# Patient Record
Sex: FEMALE | Race: WHITE | Employment: FULL TIME
[De-identification: names, ages, dates, MRNs, and addresses within clinical notes are randomized per-mention and may not be internally consistent; named-entity substitution may affect disease eponyms.]

---

## 2017-08-19 ENCOUNTER — HEALTH MAINTENANCE LETTER (OUTPATIENT)
Age: 37
End: 2017-08-19

## 2021-07-16 ENCOUNTER — RECORDS - HEALTHEAST (OUTPATIENT)
Dept: ADMINISTRATIVE | Facility: CLINIC | Age: 41
End: 2021-07-16

## 2022-06-15 ENCOUNTER — TRANSFERRED RECORDS (OUTPATIENT)
Dept: HEALTH INFORMATION MANAGEMENT | Facility: CLINIC | Age: 42
End: 2022-06-15

## 2022-07-19 ENCOUNTER — TRANSFERRED RECORDS (OUTPATIENT)
Dept: HEALTH INFORMATION MANAGEMENT | Facility: CLINIC | Age: 42
End: 2022-07-19

## 2022-07-19 ENCOUNTER — MEDICAL CORRESPONDENCE (OUTPATIENT)
Dept: HEALTH INFORMATION MANAGEMENT | Facility: CLINIC | Age: 42
End: 2022-07-19

## 2022-08-01 ENCOUNTER — HOSPITAL ENCOUNTER (OUTPATIENT)
Dept: MRI IMAGING | Facility: CLINIC | Age: 42
Discharge: HOME | End: 2022-08-01
Attending: SPECIALIST | Admitting: SPECIALIST
Payer: COMMERCIAL

## 2022-08-01 DIAGNOSIS — E23.7 PITUITARY DISEASE (H): ICD-10-CM

## 2022-08-01 PROCEDURE — 70543 MRI ORBT/FAC/NCK W/O &W/DYE: CPT

## 2022-08-01 PROCEDURE — A9585 GADOBUTROL INJECTION: HCPCS

## 2022-08-01 PROCEDURE — 255N000002 HC RX 255 OP 636

## 2022-08-01 RX ORDER — GADOBUTROL 604.72 MG/ML
8 INJECTION INTRAVENOUS ONCE
Status: COMPLETED | OUTPATIENT
Start: 2022-08-01 | End: 2022-08-01

## 2022-08-01 RX ADMIN — GADOBUTROL 8 ML: 604.72 INJECTION INTRAVENOUS at 07:49

## 2022-08-04 ENCOUNTER — OFFICE VISIT (OUTPATIENT)
Dept: SURGERY | Facility: CLINIC | Age: 42
End: 2022-08-04
Payer: COMMERCIAL

## 2022-08-04 ENCOUNTER — TELEPHONE (OUTPATIENT)
Dept: SURGERY | Facility: CLINIC | Age: 42
End: 2022-08-04

## 2022-08-04 VITALS
BODY MASS INDEX: 32.14 KG/M2 | WEIGHT: 200 LBS | HEART RATE: 79 BPM | HEIGHT: 66 IN | SYSTOLIC BLOOD PRESSURE: 120 MMHG | DIASTOLIC BLOOD PRESSURE: 80 MMHG

## 2022-08-04 DIAGNOSIS — E04.1 THYROID CYST: ICD-10-CM

## 2022-08-04 PROCEDURE — 99205 OFFICE O/P NEW HI 60 MIN: CPT | Performed by: SURGERY

## 2022-08-04 RX ORDER — EZETIMIBE 10 MG/1
10 TABLET ORAL DAILY
COMMUNITY

## 2022-08-04 NOTE — LETTER
August 8, 2022          Nika Hale MD  ENDOCRINOLOGY [facility]  [address] [facility] [address]  [place] [address]      RE:   Sergio Driver 1980      Dear Colleague,    Thank you for referring your patient, Sergio Driver, to [facility] at [facility] [place]. Please see a copy of my visit note below.    Sergio Driver is a 42 year old female who presented with the recent discovery of an enlarged right neck mass.  She was evaluated for this and this was thought to be in continuity with the thyroid gland.  Ultrasound was ordered and this revealed a cystic mass in the right lower lobe of the thyroid.  This measured approximately 5.2 cm but did not have imaging characteristics suspicious for thyroid malignancy.  No biopsy or drainage was performed.  Patient does complain of fullness in the neck as well as some difficulty sleeping.  Frequent cough, difficulty clearing her throat.  No family history of thyroid cancer.  Is euthyroid, no history of surgery to the head or neck.  She is here to discuss possible surgical intervention.     PMH:  Sergio Driver  has a past medical history of Asherman's syndrome, Benign neoplasm of pituitary gland and craniopharyngeal duct (pouch) (H) (12/31/2007), Depression, High cholesterol, Hypercholesterolemia, Hyperlipidemia, Migraine, Uterine fibroid, Uterine rupture, and UTI (urinary tract infection).  PSH:  Sergio Driver  has a past surgical history that includes GYN surgery; Abdomen surgery; Operative hysteroscopy with morcellator (11/17/2011); Abdomen surgery; Operative hysteroscopy with morcellator (8/9/2013); d & c; ------------other-------------; and Dilation and curettage, operative hysteroscopy with morcellator, combined (N/A, 12/19/2014).     Home medications and allergies reviewed.     Social History:  Sergio Driver  reports that she has never smoked. She has never used smokeless tobacco. She reports current alcohol use. She reports that she does not  "use drugs.  Family History:  Sergio Driver family history includes Cancer in her maternal grandmother; Diabetes in her maternal grandmother; Thyroid Disease in her mother.     ROS:  The 10 point Review of Systems is negative other than noted in the HPI.  Patient does complain of increased fatigue.  No recent weight loss or weight gain.     Physical Exam:  Blood pressure 120/80, pulse 79, height 1.676 m (5' 6\"), weight 90.7 kg (200 lb), unknown if currently breastfeeding.  200 lbs 0 oz  Healthy-appearing young female in no distress.  Patient has a pleasant affect and communicates well.   Pupils equal round and reactive to light.   No cervical lymphadenopathy.  Somewhat subtle but easily palpable right thyroid mass.  Fairly smooth, not terribly discrete from the surrounding thyroid lobe.  Moves appropriately with swallowing.  Skin creases sufficient for thyroid surgery.  Excellent neck range of motion.  Lung fields clear, breathing comfortably.   Heart normal sinus rhythm.  No murmurs rubs or gallops.  Abdomen soft, nontender, nondistended.  Skin warm, dry.  No obvious rashes or lesions.     All new lab and imaging data was reviewed.  This includes outside clinic notes, ultrasound reports, and imaging reports.      Assessment/plan: Pleasant healthy 42-year-old female with a large symptomatic right-sided thyroid cyst.  I explained that imaging characteristics are not particularly suspicious for thyroid neoplasm.  Despite this, given the size and symptomatology, surgery could be considered.  I offered another option which would consist of drainage of the cyst and possible surgery if the cyst fluid reaccumulated.  Patient was very apprehensive about any procedures to her neck while she was awake, going so far as to say that she would need to be under a general anesthetic for thyroid FNA.  Given this constellation of considerations, we elected to proceed with scheduling right thyroid lobectomy.  The lobe would be sent " for frozen section and the left lobe would only be removed if the mass was suspicious for cancer.  I explained the risks and benefits of thyroid surgery which include bleeding, infection, injury to the recurrent laryngeal nerve, and hypocalcemia.  Patient was interested in getting this scheduled which we will do in the near future.  Surgical co-morbities include amenorrhea, hyperlipidemia, Asherman syndrome.        Again, thank you for allowing me to participate in the care of your patient.      Sincerely,      Juan Rogers MD

## 2022-08-04 NOTE — TELEPHONE ENCOUNTER
Type of surgery: right thyroid lobectomy, possible total  Location of surgery: [facility]  Date and time of surgery: 9/7/22 8:55am  Surgeon: Dr Rogers  Pre-Op Appt Date: pt to schedule  Post-Op Appt Date: pt to schedule   Packet sent out: Yes  Pre-cert/Authorization completed:  Not Applicable  Date: 8/4/22

## 2022-08-05 NOTE — PROGRESS NOTES
Surgery Consultation, [facility], JOSE Rogers MD, MD    Sergio Driver MRN# [id number]   YOB: 1980 Age: 42 year old     PCP:  Zenon Driver [phone]    Chief Complaint: Right thyroid mass    Pt was seen in consultation from Zenon Driver.    History of Present Illness:  Sergio Driver is a 42 year old female who presented with the recent discovery of an enlarged right neck mass.  She was evaluated for this and this was thought to be in continuity with the thyroid gland.  Ultrasound was ordered and this revealed a cystic mass in the right lower lobe of the thyroid.  This measured approximately 5.2 cm but did not have imaging characteristics suspicious for thyroid malignancy.  No biopsy or drainage was performed.  Patient does complain of fullness in the neck as well as some difficulty sleeping.  Frequent cough, difficulty clearing her throat.  No family history of thyroid cancer.  Is euthyroid, no history of surgery to the head or neck.  She is here to discuss possible surgical intervention.    PMH:  Sergio Driver  has a past medical history of Asherman's syndrome, Benign neoplasm of pituitary gland and craniopharyngeal duct (pouch) (H) (12/31/2007), Depression, High cholesterol, Hypercholesterolemia, Hyperlipidemia, Migraine, Uterine fibroid, Uterine rupture, and UTI (urinary tract infection).  PSH:  Sergio Driver  has a past surgical history that includes GYN surgery; Abdomen surgery; Operative hysteroscopy with morcellator (11/17/2011); Abdomen surgery; Operative hysteroscopy with morcellator (8/9/2013); d & c; ------------other-------------; and Dilation and curettage, operative hysteroscopy with morcellator, combined (N/A, 12/19/2014).    Home medications and allergies reviewed.    Social History:  Sergio Driver  reports that she has never smoked. She has never used smokeless tobacco. She reports current alcohol use. She reports that she does not use  "drugs.  Family History:  Sergio Driver family history includes Cancer in her maternal grandmother; Diabetes in her maternal grandmother; Thyroid Disease in her mother.    ROS:  The 10 point Review of Systems is negative other than noted in the HPI.  Patient does complain of increased fatigue.  No recent weight loss or weight gain.    Physical Exam:  Blood pressure 120/80, pulse 79, height 1.676 m (5' 6\"), weight 90.7 kg (200 lb), unknown if currently breastfeeding.  200 lbs 0 oz  Healthy-appearing young female in no distress.  Patient has a pleasant affect and communicates well.   Pupils equal round and reactive to light.   No cervical lymphadenopathy.  Somewhat subtle but easily palpable right thyroid mass.  Fairly smooth, not terribly discrete from the surrounding thyroid lobe.  Moves appropriately with swallowing.  Skin creases sufficient for thyroid surgery.  Excellent neck range of motion.  Lung fields clear, breathing comfortably.   Heart normal sinus rhythm.  No murmurs rubs or gallops.  Abdomen soft, nontender, nondistended.  Skin warm, dry.  No obvious rashes or lesions.    All new lab and imaging data was reviewed.  This includes outside clinic notes, ultrasound reports, and imaging reports.     Assessment/plan: Pleasant healthy 42-year-old female with a large symptomatic right-sided thyroid cyst.  I explained that imaging characteristics are not particularly suspicious for thyroid neoplasm.  Despite this, given the size and symptomatology, surgery could be considered.  I offered another option which would consist of drainage of the cyst and possible surgery if the cyst fluid reaccumulated.  Patient was very apprehensive about any procedures to her neck while she was awake, going so far as to say that she would need to be under a general anesthetic for thyroid FNA.  Given this constellation of considerations, we elected to proceed with scheduling right thyroid lobectomy.  The lobe would be sent for " frozen section and the left lobe would only be removed if the mass was suspicious for cancer.  I explained the risks and benefits of thyroid surgery which include bleeding, infection, injury to the recurrent laryngeal nerve, and hypocalcemia.  Patient was interested in getting this scheduled which we will do in the near future.  Surgical co-morbities include amenorrhea, hyperlipidemia, Asherman syndrome.    Aj Rogers M.D.  [facility]  [phone]    Please route or send letter to:  Primary Care Provider (PCP) and Referring Provider

## 2022-08-09 ENCOUNTER — TELEPHONE (OUTPATIENT)
Dept: SURGERY | Facility: CLINIC | Age: 42
End: 2022-08-09

## 2022-08-09 NOTE — TELEPHONE ENCOUNTER
Patient called and needs a letter for her work stating she will need time off for her surgery. She stated she will take 1 week off, returning to work on 9/15/22. Letter written and e-mailed to [email or web address]    Ami Driver MA

## 2022-09-04 ENCOUNTER — LAB (OUTPATIENT)
Dept: FAMILY MEDICINE | Facility: CLINIC | Age: 42
End: 2022-09-04
Payer: COMMERCIAL

## 2022-09-04 DIAGNOSIS — Z20.822 ENCOUNTER FOR LABORATORY TESTING FOR COVID-19 VIRUS: ICD-10-CM

## 2022-09-04 PROCEDURE — U0005 INFEC AGEN DETEC AMPLI PROBE: HCPCS

## 2022-09-04 PROCEDURE — 99207 PR NO CHARGE LOS: CPT

## 2022-09-04 PROCEDURE — U0003 INFECTIOUS AGENT DETECTION BY NUCLEIC ACID (DNA OR RNA); SEVERE ACUTE RESPIRATORY SYNDROME CORONAVIRUS 2 (SARS-COV-2) (CORONAVIRUS DISEASE [COVID-19]), AMPLIFIED PROBE TECHNIQUE, MAKING USE OF HIGH THROUGHPUT TECHNOLOGIES AS DESCRIBED BY CMS-2020-01-R: HCPCS

## 2022-09-05 LAB — SARS-COV-2 RNA RESP QL NAA+PROBE: NEGATIVE

## 2022-09-06 ENCOUNTER — ANESTHESIA EVENT (OUTPATIENT)
Dept: SURGERY | Facility: CLINIC | Age: 42
End: 2022-09-06
Payer: COMMERCIAL

## 2022-09-06 NOTE — ANESTHESIA PREPROCEDURE EVALUATION
Anesthesia Pre-Procedure Evaluation    Patient: Sergio Driver   MRN: [id number] : 1980        Procedure : Procedure(s):  Right thyroid lobectomy, possible total          Past Medical History:   Diagnosis Date     Anxiety      Asherman's syndrome      Benign neoplasm of pituitary gland and craniopharyngeal duct (pouch) (H) 2007     Cyst, thyroid      Depression      Dysphagia, unspecified      Gastroesophageal reflux disease      High cholesterol      Hypercholesterolemia      Hyperlipidemia      Migraine      Pain, abdominal      Uterine fibroid      Uterine rupture     after d&c     UTI (urinary tract infection)      Vitamin D deficiency       Past Surgical History:   Procedure Laterality Date     ------------OTHER-------------      lap/uterine surgery to repair rupture     ABDOMEN SURGERY      laparoscopy repair after d and c     D & C       DILATION AND CURETTAGE, OPERATIVE HYSTEROSCOPY WITH MORCELLATOR, COMBINED N/A 2014    Procedure: COMBINED DILATION AND CURETTAGE, OPERATIVE HYSTEROSCOPY WITH MORCELLATOR;  Surgeon: Priti Pastrana MD;  Location: [facility]     GENITOURINARY SURGERY      age 8, rectal surgery     GYN SURGERY      D&C, D&C FOR RETAINED PLACENTA, LAPAROSCOPY FOR PERFORATED UTERUS     OPERATIVE HYSTEROSCOPY WITH MORCELLATOR  2011    Procedure:OPERATIVE HYSTEROSCOPY WITH MORCELLATOR; OPERATIVE HYSTEROSCOPY WITH MORCELLATOR WITH LYSIS OF ADHESIONS (MYOSURE DEVISE AND VERSAPOINTE); Surgeon:PRITI PASTRANA; Location:[facility]     OPERATIVE HYSTEROSCOPY WITH MORCELLATOR  2013    Procedure: OPERATIVE HYSTEROSCOPY WITH MORCELLATOR;  OPERATIVE HYSTEROSCOPY, REMOVAL INTRAUTERINE MASS WITH LEWIS [facility]PH[facility]Fair MORCELLATOR ;  Surgeon: Priti Pastrana MD;  Location: [facility]     RECTAL SURGERY        Allergies   Allergen Reactions     Bromocriptine Mesylate Other (See Comments)     vertigo     Sulfa Drugs Hives      Social History     Tobacco Use     Smoking status: Never Smoker      Smokeless tobacco: Never Used   Substance Use Topics     Alcohol use: Yes     Comment: 1 per week      Wt Readings from Last 1 Encounters:   08/04/22 90.7 kg (200 lb)        Anesthesia Evaluation   Pt has had prior anesthetic.     No history of anesthetic complications       ROS/MED HX  ENT/Pulmonary:       Neurologic: Comment: Benign neoplasm of pituitary gland and craniopharyngeal duct (pouch) (H)      (+) migraines,     Cardiovascular: Comment: No coronary calcification by CT    (+) Dyslipidemia -----    METS/Exercise Tolerance: >4 METS    Hematologic: Comments: Hgb 13.0/Plt 313      Musculoskeletal:       GI/Hepatic: Comment: H/o dysphagia      (+) GERD,     Renal/Genitourinary: Comment: H/o uterine fibroid  H/o Asherman's syndrome      Endo: Comment: H/o thyroid cyst    (+) thyroid problem,     Psychiatric/Substance Use:  - neg psychiatric ROS     Infectious Disease:  - neg infectious disease ROS     Malignancy:       Other:            Physical Exam    Airway  airway exam normal      Mallampati: II   TM distance: > 3 FB   Neck ROM: full   Mouth opening: > 3 cm    Respiratory Devices and Support         Dental  no notable dental history         Cardiovascular   cardiovascular exam normal          Pulmonary   pulmonary exam normal                OUTSIDE LABS:  CBC:   Lab Results   Component Value Date    WBC 7.2 03/17/2015    WBC 6.5 05/14/2014    HGB 13.5 03/17/2015    HGB 13.3 05/14/2014    HCT 40.0 03/17/2015    HCT 39.3 05/14/2014     03/17/2015     05/14/2014     BMP:   Lab Results   Component Value Date     03/17/2015     05/14/2014    POTASSIUM 3.9 03/17/2015    POTASSIUM 4.0 05/14/2014    CHLORIDE 105 03/17/2015    CHLORIDE 101 05/14/2014    CO2 26 03/17/2015    CO2 26 05/14/2014    BUN 10 03/17/2015    BUN 11 05/14/2014    CR 0.74 03/17/2015    CR 0.69 05/14/2014    GLC 89 03/17/2015    GLC 96 05/14/2014     COAGS: No results found for: PTT, INR, FIBR  POC:   Lab Results    Component Value Date    HCG Negative 03/17/2015    HCGS Negative 05/14/2014     HEPATIC:   Lab Results   Component Value Date    ALBUMIN 3.7 03/17/2015    PROTTOTAL 7.7 03/17/2015    ALT 27 03/17/2015    AST 19 03/17/2015    ALKPHOS 38 (L) 03/17/2015    BILITOTAL 0.6 03/17/2015     OTHER:   Lab Results   Component Value Date    VERNA 9.0 03/17/2015    LIPASE 73 05/14/2014       Anesthesia Plan    ASA Status:  2      Anesthesia Type: General.     - Airway: ETT      Maintenance: Balanced.   Techniques and Equipment:     - Airway: Video-Laryngoscope         Consents    Anesthesia Plan(s) and associated risks, benefits, and realistic alternatives discussed. Questions answered and patient/representative(s) expressed understanding.    - Discussed:     - Discussed with:  Patient         Postoperative Care    Pain management: IV analgesics, Multi-modal analgesia.   PONV prophylaxis: Ondansetron (or other 5HT-3), Dexamethasone or Solumedrol     Comments:                Juan Madrid MD

## 2022-09-06 NOTE — PROGRESS NOTES
PTA medications updated by Medication Scribe prior to surgery via phone call with patient (last doses completed by Nurse)     Medication history sources: Patient, Surescripts and H&P  In the past week, patient estimated taking medication this percent of the time: Greater than 90%  Adherence assessment: N/A Not Observed    Significant changes made to the medication list:  None      Additional medication history information:   None    Medication reconciliation completed by provider prior to medication history? No    Time spent in this activity: 25 minutes    The information provided in this note is only as accurate as the sources available at the time of update(s)    Prior to Admission medications    Medication Sig Last Dose Taking? Auth Provider Long Term End Date   ezetimibe (ZETIA) 10 MG tablet Take 10 mg by mouth daily 9/6/2022 at pm Yes Reported, Patient Yes    norethindrone-ethinyl estradiol (JUNEL FE 1/20) 1-20 MG-MCG per tablet Take 1 tablet by mouth daily 9/1/2022 at pm Yes Reported, Patient No    rosuvastatin (CRESTOR) 20 MG tablet Take 20 mg by mouth daily 9/6/2022 at pm Yes Reported, Patient No      Medication history completed by:    Tyler Lazar CPhT  Medication Scribe  [facility]

## 2022-09-07 ENCOUNTER — ANESTHESIA (OUTPATIENT)
Dept: SURGERY | Facility: CLINIC | Age: 42
End: 2022-09-07
Payer: COMMERCIAL

## 2022-09-07 ENCOUNTER — HOSPITAL ENCOUNTER (OUTPATIENT)
Facility: CLINIC | Age: 42
Discharge: HOME | End: 2022-09-07
Attending: SURGERY | Admitting: SURGERY
Payer: COMMERCIAL

## 2022-09-07 VITALS
OXYGEN SATURATION: 95 % | HEIGHT: 66 IN | WEIGHT: 190.7 LBS | HEART RATE: 72 BPM | TEMPERATURE: 96.2 F | DIASTOLIC BLOOD PRESSURE: 78 MMHG | RESPIRATION RATE: 16 BRPM | BODY MASS INDEX: 30.65 KG/M2 | SYSTOLIC BLOOD PRESSURE: 115 MMHG

## 2022-09-07 DIAGNOSIS — E04.1 THYROID CYST: ICD-10-CM

## 2022-09-07 LAB — HCG UR QL: NEGATIVE

## 2022-09-07 PROCEDURE — 272N000001 HC OR GENERAL SUPPLY STERILE: Performed by: SURGERY

## 2022-09-07 PROCEDURE — 370N000017 HC ANESTHESIA TECHNICAL FEE, PER MIN: Performed by: SURGERY

## 2022-09-07 PROCEDURE — 250N000011 HC RX IP 250 OP 636: Performed by: SURGERY

## 2022-09-07 PROCEDURE — 999N000141 HC STATISTIC PRE-PROCEDURE NURSING ASSESSMENT: Performed by: SURGERY

## 2022-09-07 PROCEDURE — 250N000025 HC SEVOFLURANE, PER MIN: Performed by: SURGERY

## 2022-09-07 PROCEDURE — 250N000011 HC RX IP 250 OP 636: Performed by: NURSE ANESTHETIST, CERTIFIED REGISTERED

## 2022-09-07 PROCEDURE — 88307 TISSUE EXAM BY PATHOLOGIST: CPT | Mod: 26 | Performed by: PATHOLOGY

## 2022-09-07 PROCEDURE — 88331 PATH CONSLTJ SURG 1 BLK 1SPC: CPT | Mod: TC | Performed by: SURGERY

## 2022-09-07 PROCEDURE — 250N000013 HC RX MED GY IP 250 OP 250 PS 637: Performed by: ANESTHESIOLOGY

## 2022-09-07 PROCEDURE — 710N000009 HC RECOVERY PHASE 1, LEVEL 1, PER MIN: Performed by: SURGERY

## 2022-09-07 PROCEDURE — 250N000011 HC RX IP 250 OP 636: Performed by: ANESTHESIOLOGY

## 2022-09-07 PROCEDURE — 258N000003 HC RX IP 258 OP 636: Performed by: NURSE ANESTHETIST, CERTIFIED REGISTERED

## 2022-09-07 PROCEDURE — 88329 PATH CONSLTJ DRG SURG: CPT | Performed by: PATHOLOGY

## 2022-09-07 PROCEDURE — 360N000076 HC SURGERY LEVEL 3, PER MIN: Performed by: SURGERY

## 2022-09-07 PROCEDURE — 81025 URINE PREGNANCY TEST: CPT | Performed by: ANESTHESIOLOGY

## 2022-09-07 PROCEDURE — 250N000009 HC RX 250: Performed by: SURGERY

## 2022-09-07 PROCEDURE — 60220 PARTIAL REMOVAL OF THYROID: CPT | Mod: RT | Performed by: PHYSICIAN ASSISTANT

## 2022-09-07 PROCEDURE — 250N000009 HC RX 250: Performed by: NURSE ANESTHETIST, CERTIFIED REGISTERED

## 2022-09-07 PROCEDURE — 60220 PARTIAL REMOVAL OF THYROID: CPT | Mod: RT | Performed by: SURGERY

## 2022-09-07 PROCEDURE — 710N000012 HC RECOVERY PHASE 2, PER MINUTE: Performed by: SURGERY

## 2022-09-07 RX ORDER — SODIUM CHLORIDE, SODIUM LACTATE, POTASSIUM CHLORIDE, CALCIUM CHLORIDE 600; 310; 30; 20 MG/100ML; MG/100ML; MG/100ML; MG/100ML
INJECTION, SOLUTION INTRAVENOUS CONTINUOUS PRN
Status: DISCONTINUED | OUTPATIENT
Start: 2022-09-07 | End: 2022-09-07

## 2022-09-07 RX ORDER — SODIUM CHLORIDE, SODIUM LACTATE, POTASSIUM CHLORIDE, CALCIUM CHLORIDE 600; 310; 30; 20 MG/100ML; MG/100ML; MG/100ML; MG/100ML
INJECTION, SOLUTION INTRAVENOUS CONTINUOUS
Status: DISCONTINUED | OUTPATIENT
Start: 2022-09-07 | End: 2022-09-07 | Stop reason: HOSPADM

## 2022-09-07 RX ORDER — ONDANSETRON 2 MG/ML
4 INJECTION INTRAMUSCULAR; INTRAVENOUS EVERY 30 MIN PRN
Status: DISCONTINUED | OUTPATIENT
Start: 2022-09-07 | End: 2022-09-07 | Stop reason: HOSPADM

## 2022-09-07 RX ORDER — CEFAZOLIN SODIUM/WATER 2 G/20 ML
2 SYRINGE (ML) INTRAVENOUS SEE ADMIN INSTRUCTIONS
Status: DISCONTINUED | OUTPATIENT
Start: 2022-09-07 | End: 2022-09-07 | Stop reason: HOSPADM

## 2022-09-07 RX ORDER — HYDROCODONE BITARTRATE AND ACETAMINOPHEN 5; 325 MG/1; MG/1
1-2 TABLET ORAL EVERY 4 HOURS PRN
Qty: 10 TABLET | Refills: 0 | Status: SHIPPED | OUTPATIENT
Start: 2022-09-07 | End: 2022-12-06

## 2022-09-07 RX ORDER — HYDROMORPHONE HCL IN WATER/PF 6 MG/30 ML
0.2 PATIENT CONTROLLED ANALGESIA SYRINGE INTRAVENOUS EVERY 5 MIN PRN
Status: DISCONTINUED | OUTPATIENT
Start: 2022-09-07 | End: 2022-09-07 | Stop reason: HOSPADM

## 2022-09-07 RX ORDER — FENTANYL CITRATE 50 UG/ML
INJECTION, SOLUTION INTRAMUSCULAR; INTRAVENOUS PRN
Status: DISCONTINUED | OUTPATIENT
Start: 2022-09-07 | End: 2022-09-07

## 2022-09-07 RX ORDER — LIDOCAINE HYDROCHLORIDE 20 MG/ML
INJECTION, SOLUTION INFILTRATION; PERINEURAL PRN
Status: DISCONTINUED | OUTPATIENT
Start: 2022-09-07 | End: 2022-09-07

## 2022-09-07 RX ORDER — HYDROCODONE BITARTRATE AND ACETAMINOPHEN 5; 325 MG/1; MG/1
1 TABLET ORAL ONCE
Status: COMPLETED | OUTPATIENT
Start: 2022-09-07 | End: 2022-09-07

## 2022-09-07 RX ORDER — AMOXICILLIN 250 MG
1-2 CAPSULE ORAL 2 TIMES DAILY PRN
Qty: 15 TABLET | Refills: 0 | Status: SHIPPED | OUTPATIENT
Start: 2022-09-07 | End: 2022-12-06

## 2022-09-07 RX ORDER — ONDANSETRON 4 MG/1
4 TABLET, ORALLY DISINTEGRATING ORAL EVERY 30 MIN PRN
Status: DISCONTINUED | OUTPATIENT
Start: 2022-09-07 | End: 2022-09-07 | Stop reason: HOSPADM

## 2022-09-07 RX ORDER — FENTANYL CITRATE 0.05 MG/ML
25 INJECTION, SOLUTION INTRAMUSCULAR; INTRAVENOUS EVERY 5 MIN PRN
Status: DISCONTINUED | OUTPATIENT
Start: 2022-09-07 | End: 2022-09-07 | Stop reason: HOSPADM

## 2022-09-07 RX ORDER — FENTANYL CITRATE 0.05 MG/ML
25 INJECTION, SOLUTION INTRAMUSCULAR; INTRAVENOUS
Status: DISCONTINUED | OUTPATIENT
Start: 2022-09-07 | End: 2022-09-07 | Stop reason: HOSPADM

## 2022-09-07 RX ORDER — PROPOFOL 10 MG/ML
INJECTION, EMULSION INTRAVENOUS CONTINUOUS PRN
Status: DISCONTINUED | OUTPATIENT
Start: 2022-09-07 | End: 2022-09-07

## 2022-09-07 RX ORDER — MAGNESIUM HYDROXIDE 1200 MG/15ML
LIQUID ORAL PRN
Status: DISCONTINUED | OUTPATIENT
Start: 2022-09-07 | End: 2022-09-07 | Stop reason: HOSPADM

## 2022-09-07 RX ORDER — MEPERIDINE HYDROCHLORIDE 25 MG/ML
12.5 INJECTION INTRAMUSCULAR; INTRAVENOUS; SUBCUTANEOUS
Status: DISCONTINUED | OUTPATIENT
Start: 2022-09-07 | End: 2022-09-07 | Stop reason: HOSPADM

## 2022-09-07 RX ORDER — HYDROXYZINE HYDROCHLORIDE 25 MG/1
25 TABLET, FILM COATED ORAL ONCE
Status: COMPLETED | OUTPATIENT
Start: 2022-09-07 | End: 2022-09-07

## 2022-09-07 RX ORDER — DEXAMETHASONE SODIUM PHOSPHATE 4 MG/ML
INJECTION, SOLUTION INTRA-ARTICULAR; INTRALESIONAL; INTRAMUSCULAR; INTRAVENOUS; SOFT TISSUE PRN
Status: DISCONTINUED | OUTPATIENT
Start: 2022-09-07 | End: 2022-09-07

## 2022-09-07 RX ORDER — HYDROCODONE BITARTRATE AND ACETAMINOPHEN 5; 325 MG/1; MG/1
1-2 TABLET ORAL
Status: DISCONTINUED | OUTPATIENT
Start: 2022-09-07 | End: 2022-09-07 | Stop reason: HOSPADM

## 2022-09-07 RX ORDER — CEFAZOLIN SODIUM/WATER 2 G/20 ML
2 SYRINGE (ML) INTRAVENOUS
Status: COMPLETED | OUTPATIENT
Start: 2022-09-07 | End: 2022-09-07

## 2022-09-07 RX ORDER — BUPIVACAINE HYDROCHLORIDE AND EPINEPHRINE 2.5; 5 MG/ML; UG/ML
INJECTION, SOLUTION EPIDURAL; INFILTRATION; INTRACAUDAL; PERINEURAL
Status: DISCONTINUED
Start: 2022-09-07 | End: 2022-09-07 | Stop reason: HOSPADM

## 2022-09-07 RX ORDER — EPHEDRINE SULFATE 50 MG/ML
INJECTION, SOLUTION INTRAMUSCULAR; INTRAVENOUS; SUBCUTANEOUS PRN
Status: DISCONTINUED | OUTPATIENT
Start: 2022-09-07 | End: 2022-09-07

## 2022-09-07 RX ORDER — BUPIVACAINE HYDROCHLORIDE AND EPINEPHRINE 2.5; 5 MG/ML; UG/ML
INJECTION, SOLUTION INFILTRATION; PERINEURAL PRN
Status: DISCONTINUED | OUTPATIENT
Start: 2022-09-07 | End: 2022-09-07 | Stop reason: HOSPADM

## 2022-09-07 RX ORDER — ONDANSETRON 2 MG/ML
INJECTION INTRAMUSCULAR; INTRAVENOUS PRN
Status: DISCONTINUED | OUTPATIENT
Start: 2022-09-07 | End: 2022-09-07

## 2022-09-07 RX ORDER — PROPOFOL 10 MG/ML
INJECTION, EMULSION INTRAVENOUS PRN
Status: DISCONTINUED | OUTPATIENT
Start: 2022-09-07 | End: 2022-09-07

## 2022-09-07 RX ORDER — HYDROMORPHONE HYDROCHLORIDE 1 MG/ML
INJECTION, SOLUTION INTRAMUSCULAR; INTRAVENOUS; SUBCUTANEOUS PRN
Status: DISCONTINUED | OUTPATIENT
Start: 2022-09-07 | End: 2022-09-07

## 2022-09-07 RX ADMIN — Medication 5 MG: at 08:48

## 2022-09-07 RX ADMIN — HYDROMORPHONE HYDROCHLORIDE 0.5 MG: 1 INJECTION, SOLUTION INTRAMUSCULAR; INTRAVENOUS; SUBCUTANEOUS at 08:38

## 2022-09-07 RX ADMIN — LIDOCAINE HYDROCHLORIDE 100 MG: 20 INJECTION, SOLUTION INFILTRATION; PERINEURAL at 08:14

## 2022-09-07 RX ADMIN — PHENYLEPHRINE HYDROCHLORIDE 50 MCG: 10 INJECTION INTRAVENOUS at 09:43

## 2022-09-07 RX ADMIN — HYDROCODONE BITARTRATE AND ACETAMINOPHEN 1 TABLET: 5; 325 TABLET ORAL at 11:42

## 2022-09-07 RX ADMIN — FENTANYL CITRATE 25 MCG: 50 INJECTION, SOLUTION INTRAMUSCULAR; INTRAVENOUS at 10:33

## 2022-09-07 RX ADMIN — FENTANYL CITRATE 100 MCG: 50 INJECTION, SOLUTION INTRAMUSCULAR; INTRAVENOUS at 08:14

## 2022-09-07 RX ADMIN — FENTANYL CITRATE 25 MCG: 50 INJECTION, SOLUTION INTRAMUSCULAR; INTRAVENOUS at 10:40

## 2022-09-07 RX ADMIN — ONDANSETRON 4 MG: 2 INJECTION INTRAMUSCULAR; INTRAVENOUS at 09:48

## 2022-09-07 RX ADMIN — MIDAZOLAM 2 MG: 1 INJECTION INTRAMUSCULAR; INTRAVENOUS at 08:09

## 2022-09-07 RX ADMIN — HYDROMORPHONE HYDROCHLORIDE 0.2 MG: 0.2 INJECTION, SOLUTION INTRAMUSCULAR; INTRAVENOUS; SUBCUTANEOUS at 11:02

## 2022-09-07 RX ADMIN — Medication 2 G: at 08:09

## 2022-09-07 RX ADMIN — HYDROMORPHONE HYDROCHLORIDE 0.2 MG: 0.2 INJECTION, SOLUTION INTRAMUSCULAR; INTRAVENOUS; SUBCUTANEOUS at 10:52

## 2022-09-07 RX ADMIN — HYDROMORPHONE HYDROCHLORIDE 0.2 MG: 0.2 INJECTION, SOLUTION INTRAMUSCULAR; INTRAVENOUS; SUBCUTANEOUS at 11:29

## 2022-09-07 RX ADMIN — SODIUM CHLORIDE, POTASSIUM CHLORIDE, SODIUM LACTATE AND CALCIUM CHLORIDE: 600; 310; 30; 20 INJECTION, SOLUTION INTRAVENOUS at 08:09

## 2022-09-07 RX ADMIN — Medication 5 MG: at 08:25

## 2022-09-07 RX ADMIN — DEXAMETHASONE SODIUM PHOSPHATE 4 MG: 4 INJECTION, SOLUTION INTRA-ARTICULAR; INTRALESIONAL; INTRAMUSCULAR; INTRAVENOUS; SOFT TISSUE at 08:21

## 2022-09-07 RX ADMIN — PROPOFOL 200 MG: 10 INJECTION, EMULSION INTRAVENOUS at 08:14

## 2022-09-07 RX ADMIN — SUCCINYLCHOLINE CHLORIDE 120 MG: 20 INJECTION, SOLUTION INTRAMUSCULAR; INTRAVENOUS; PARENTERAL at 08:14

## 2022-09-07 RX ADMIN — HYDROMORPHONE HYDROCHLORIDE 0.2 MG: 0.2 INJECTION, SOLUTION INTRAMUSCULAR; INTRAVENOUS; SUBCUTANEOUS at 11:15

## 2022-09-07 RX ADMIN — HYDROXYZINE HYDROCHLORIDE 25 MG: 25 TABLET, FILM COATED ORAL at 11:42

## 2022-09-07 RX ADMIN — PROPOFOL 50 MCG/KG/MIN: 10 INJECTION, EMULSION INTRAVENOUS at 08:22

## 2022-09-07 ASSESSMENT — ACTIVITIES OF DAILY LIVING (ADL)
ADLS_ACUITY_SCORE: 35
ADLS_ACUITY_SCORE: 33

## 2022-09-07 NOTE — OP NOTE
General Surgery Operative Note    PREOPERATIVE DIAGNOSIS:  Thyroid cyst [E04.1]    POSTOPERATIVE DIAGNOSIS:  Same    PROCEDURE:  Right Thyroid Lobectomy with recurrent laryngeal nerve monitor.    Auto-transplantation of right superior parathyroid gland    ANESTHESIA:  General.    PREOPERATIVE MEDICATIONS:  Ancef IV.    SURGEON:  Juan Rogers MD, MD    ASSISTANT:  Neha Reyes PA-C  A first assistant was necessary owing to challenging exposure of anatomy.  Retraction and hemostasis were also necessary.    ESTIMATED BLOOD LOSS:  10 mL    INDICATIONS:  Sergio Driver is a 42 year old female with a thyroid nodule on the right which causes dysphagia.  They are here today for right thyroid lobectomy, possible total thyroidectomy.    PROCEDURE:  The patient was placed supine, head and neck in extension between the scapulas and transverse cervical neck creases had been marked in the preinduction area and the one most suitable was utilized for exposure.  Superior and inferior skin flaps raised.  Midline fascia opened and reflected to the right.  The upper pole was taken down by double ligation and division.  Middle thyroidal vein was ligated and the inferior pole veins ligated and the gland reflected medially, up and out of the incision.  Posterior dissection was then done under direct vision after the gland was reflected medially.  The superior parathyroid and the recurrent laryngeal nerve were seen and preserved during that posterior dissection.  Superior parathyroid appeared dusky, however, and was set aside for later re-implantation.  Nerve conduction was confirmed with nerve monitor.  The ligament of Reyes was taken down meticulously.  The inferior thyroidal artery was ligated and divided.  The inferior parathyroid was seen and preserved.  Once we dissected across the anterior trachea, we divided through the isthmus and frozen section confirmed colloid nodule.  We then proceeded with autotransplantation of  the right superior parathyroid gland.  The gland itself was morcellated with a scalpel.  We opened the fascia over the right sternocleidomastoid muscle and created several pockets.  The morcellated parathyroid was placed within three separate pockets in the SCM.  The overlying fascia was then closed with a 5-0 Prolene.  We then irrigated the site, inspected for hemostasis and closed with running 3-0 Vicryl for the midline fascia, interrupted for platysma and 4-0 subcuticular Monocryl for skin.  The patient was transferred to recovery in good condition.    INTRAOPERATIVE FINDINGS:  Colloid nodule.  R superior parathyroid autotransplanted into R SCM.    Specimens: R thyroid lobe    Juan Rogers MD, MD

## 2022-09-07 NOTE — BRIEF OP NOTE
[facility]    Brief Operative Note    Pre-operative diagnosis: Right thyroid cyst [E04.1]  Post-operative diagnosis Same    Procedure:    Right thyroid lobectomy with recurrent laryngeal nerve monitor and re-implantation of right parathyroid gland    Surgeon:       * Juan Rogers MD - Primary     * Neha Reyes PA-C - Assisting    Anesthesia: General     Estimated Blood Loss: 10 mL from 9/7/2022  8:09 AM to 9/7/2022 10:21 AM      Specimens:   ID Type Source Tests Collected by Time Destination   1 : RIGHT THYROID LOBECTOMY  Tissue Thyroid, Right SURGICAL PATHOLOGY EXAM Juan Rogers MD 9/7/2022  9:46 AM      Findings: Right thyroid tissue benign on frozen pathology. Parathyroid on right side devascularized, re-implanted into muscle. No immediate complications. See operative report for full details.      Neha Reyes PA-C  [facility]  [phone]

## 2022-09-07 NOTE — ANESTHESIA POSTPROCEDURE EVALUATION
Patient: Sergio Driver    Procedure: Procedure(s):  Right thyroid lobectomy       Anesthesia Type:  General    Note:  Disposition: Outpatient   Postop Pain Control: Uneventful            Sign Out: Typical post-op surgical pain. Pain control is now improving.   PONV: No   Neuro/Psych: Uneventful            Sign Out: Acceptable/Baseline neuro status   Airway/Respiratory: Uneventful            Sign Out: Acceptable/Baseline resp. status   CV/Hemodynamics: Uneventful            Sign Out: Acceptable CV status   Other NRE: NONE   DID A NON-ROUTINE EVENT OCCUR? No           Last vitals:  Vitals Value Taken Time   /82 09/07/22 1200   Temp 35.8  C (96.4  F) 09/07/22 1145   Pulse 80 09/07/22 1203   Resp 0 09/07/22 1203   SpO2 94 % 09/07/22 1203   Vitals shown include unvalidated device data.    Electronically Signed By: Juan Madrid MD  September 7, 2022  2:09 PM

## 2022-09-07 NOTE — ANESTHESIA PROCEDURE NOTES
Airway       Patient location during procedure: OR       Procedure Start/Stop Times: 9/7/2022 8:17 AM  Staff -        Anesthesiologist:  Juan Madrid MD       CRNA: Liseth Pratt APRN CRNA       Other Anesthesia Staff: Mitra Cortez       Performed By: SRNA and with CRNAs       Procedure performed by resident/fellow/CRNA in presence of a teaching physician.    Consent for Airway        Urgency: elective  Indications and Patient Condition       Indications for airway management: chase-procedural and airway protection       Induction type:intravenous       Mask difficulty assessment: 3 - difficult mask (inadequate, unstable, or two providers) +/- NMBA    Final Airway Details       Final airway type: endotracheal airway       Successful airway: NIM  Endotracheal Airway Details        ETT size (mm): 7.0       Cuffed: yes       Successful intubation technique: video laryngoscopy       VL Blade Size: Glidescope 3       Grade View of Cords: 1       Adjucts: stylet       Position: Right       Measured from: gums/teeth       Secured at (cm): 22       Bite block used: Soft    Post intubation assessment        Placement verified by: capnometry, equal breath sounds and chest rise        Number of attempts at approach: 1       Number of other approaches attempted: 0       Secured with: silk tape       Ease of procedure: easy       Dentition: Intact and Unchanged    Medication(s) Administered   Medication Administration Time: 9/7/2022 8:17 AM

## 2022-09-07 NOTE — DISCHARGE INSTRUCTIONS
[facility]  Discharge Instructions: Post-Operative Thyroid Surgery    ACTIVITY  Take frequent, short walks and increase your activity gradually.    Avoid strenuous physical activity or heavy lifting greater than 15-20 lbs. for 1-2 weeks.  You may climb stairs.  You may drive without restrictions when you are not using any prescription pain medication and feel comfortable in a car.  You may return to work/school when you are comfortable without any prescription pain medication.    WOUND CARE  You may remove your bandage and shower 48 hours after the surgery.  Pat your incision dry and leave it open to air.  Re-apply dressing (Band-Aids or gauze/tape) as needed for comfort or drainage.  You may have steri-strips (looks like white tape) on your incision.  You may peel off the steri-strips 2 weeks after your surgery if they have not peeled off on their own.   Do not soak your incision in a tub or pool for 2 weeks.   Do not apply any lotions, creams, or ointments to your incision.  A ridge under your incision is normal and will gradually resolve.    DIET  Start with liquids, then gradually resume your regular diet as tolerated.  Drink plenty of fluids to stay hydrated.    PAIN  Expect some tenderness and discomfort at the incision site(s).  Use the prescribed pain medication at your discretion.  Expect gradual resolution of your pain over several days.  You may take ibuprofen with food (unless you have been told not to) instead of or in addition to your prescribed pain medication.  If you are taking Norco, do not take any additional acetaminophen/Tylenol.  Do not drink alcohol or drive while you are taking pain medications.  You may apply ice to your incisions in 20 minute intervals as needed for the next 48 hours.  After that time, consider switching to heat if you prefer.   Watch for symptoms of numbness or tingling around the mouth or in the fingers or toes.  This may be a sign of a low  calcium level.  Please contact the office so we can evaluate your symptoms.  You may need to have your blood calcium level checked by doing a simple blood test.    EXPECTATIONS  Pain medications can cause constipation.  Limit use when possible.  Take over the counter stool softener/stimulant, such as Colace or Senna, 1-2 times a day with plenty of water.  You may take a mild over the counter laxative, such as Miralax or a suppository, as needed.    You may discontinue these medications once you are having regular bowel movements and/or are no longer taking your narcotic pain medication.     RETURN APPOINTMENT  Follow up with your surgeon (Dr. Juan Rogers) in 2 weeks.  Please call our office at [phone] to schedule your appointment.  We are located at [address] [place] [address].    CALL OUR OFFICE -[phone] IF YOU HAVE:   Chills or fever above 101 F.  Increased redness, warmth, or drainage at your incisions.  Significant bleeding.  Pain not relieved by your pain medication or rest.  Increasing pain after the first 48 hours.  Any other concerns or questions.              Same Day Surgery Discharge Instructions for  Sedation and General Anesthesia     It's not unusual to feel dizzy, light-headed or faint for up to 24 hours after surgery or while taking pain medication.  If you have these symptoms: sit for a few minutes before standing and have someone assist you when you get up to walk or use the bathroom.    You should rest and relax for the next 24 hours. We recommend you make arrangements to have an adult stay with you for at least 24 hours after your discharge.  Avoid hazardous and strenuous activity.    DO NOT DRIVE any vehicle or operate mechanical equipment for 24 hours following the end of your surgery.  Even though you may feel normal, your reactions may be affected by the medication you have received.    Do not drink alcoholic beverages for 24 hours following surgery.      Slowly progress to your regular diet as you feel able. It's not unusual to feel nauseated and/or vomit after receiving anesthesia.  If you develop these symptoms, drink clear liquids (apple juice, ginger ale, broth, 7-up, etc. ) until you feel better.  If your nausea and vomiting persists for 24 hours, please notify your surgeon.      All narcotic pain medications, along with inactivity and anesthesia, can cause constipation. Drinking plenty of liquids and increasing fiber intake will help.    For any questions of a medical nature, call your surgeon.    Do not make important decisions for 24 hours.    If you had general anesthesia, you may have a sore throat for a couple of days related to the breathing tube used during surgery.  You may use Cepacol lozenges to help with this discomfort.  If it worsens or if you develop a fever, contact your surgeon.     If you feel your pain is not well managed with the pain medications prescribed by your surgeon, please contact your surgeon's office to let them know so they can address your concerns.      **If you have concerns or questions about your procedure,    please contact Dr Rogers at  [phone]**

## 2022-09-07 NOTE — ANESTHESIA CARE TRANSFER NOTE
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    Anesthesia Type:   General     Note:    Oropharynx: oropharynx clear of all foreign objects  Level of Consciousness: awake  Oxygen Supplementation: face mask  Level of Supplemental Oxygen (L/min / FiO2): 6  Independent Airway: airway patency satisfactory and stable  Dentition: dentition unchanged  Vital Signs Stable: post-procedure vital signs reviewed and stable  Report to RN Given: handoff report given  Patient transferred to: PACU    Handoff Report: Identifed the Patient, Identified the Reponsible Provider, Reviewed the pertinent medical history, Discussed the surgical course, Reviewed Intra-OP anesthesia mangement and issues during anesthesia, Set expectations for post-procedure period and Allowed opportunity for questions and acknowledgement of understanding      Vitals:  Vitals Value Taken Time   /77 09/07/22 1023   Temp     Pulse 89 09/07/22 1026   Resp 13 09/07/22 1026   SpO2 99 % 09/07/22 1026   Vitals shown include unvalidated device data.    Electronically Signed By: MONO Segura CRNA  September 7, 2022  10:27 AM

## 2022-09-08 ENCOUNTER — TELEPHONE (OUTPATIENT)
Dept: SURGERY | Facility: CLINIC | Age: 42
End: 2022-09-08

## 2022-09-08 ENCOUNTER — MYC MEDICAL ADVICE (OUTPATIENT)
Dept: SURGERY | Facility: CLINIC | Age: 42
End: 2022-09-08

## 2022-09-08 DIAGNOSIS — G89.18 POST-OP PAIN: Primary | ICD-10-CM

## 2022-09-08 DIAGNOSIS — E04.1 THYROID CYST: ICD-10-CM

## 2022-09-08 LAB
PATH REPORT.COMMENTS IMP SPEC: NORMAL
PATH REPORT.COMMENTS IMP SPEC: NORMAL
PATH REPORT.FINAL DX SPEC: NORMAL
PATH REPORT.GROSS SPEC: NORMAL
PATH REPORT.INTRAOP OBS SPEC DOC: NORMAL
PATH REPORT.MICROSCOPIC SPEC OTHER STN: NORMAL
PATH REPORT.RELEVANT HX SPEC: NORMAL
PHOTO IMAGE: NORMAL

## 2022-09-08 RX ORDER — HYDROCODONE BITARTRATE AND ACETAMINOPHEN 5; 325 MG/1; MG/1
1 TABLET ORAL EVERY 6 HOURS PRN
Qty: 8 TABLET | Refills: 0 | Status: CANCELLED | OUTPATIENT
Start: 2022-09-08

## 2022-09-08 RX ORDER — HYDROCODONE BITARTRATE AND ACETAMINOPHEN 5; 325 MG/1; MG/1
1 TABLET ORAL EVERY 6 HOURS PRN
Qty: 10 TABLET | Refills: 0 | Status: SHIPPED | OUTPATIENT
Start: 2022-09-08 | End: 2022-09-11

## 2022-09-08 NOTE — TELEPHONE ENCOUNTER
Procedure: right thyroid lobectomy with recurrent laryngeal nerve monitor. Auto-transplantation of right superior parathyroid gland    Date: 09/07/2022    Surgeon: Ken    Patient call to request refill of norco. She does still have #6 left, but she is calling ahead of the weekend.  She is utilizing one tablet with each dosing.  Alternating with ibuprofen and tylenol. She knows to monitor how much tylenol she is taking and to not exceed 4000 mg in a 24 hour period    She reports bruising near and below collarbone.  Informed her that it looks like parathyroid gland was autotransplanted in that area, which would explain bruising    She is using ice, ,wondeirng when she can start using heat. Informed her she can start adding heat in tomorrow    Patient wondering if she needs to sleep in certain positions. Informed her it is all up to level of her comfort    All questions answered. Operative report sent via [employer] for her review    Patient will call PRN and refill of norco will be sent to her preferred pharmacy      Yue Campo RN-BSN

## 2022-09-08 NOTE — TELEPHONE ENCOUNTER
Patient had a right thyroid lobectomy 9/7/22 [facility] and would like a refill of HYDROcodone-acetaminophen Norco    Also has questions about how she should be sleeping    [facility] uses [facility] [address] in [place]    Please call    Phone: [phone]  Message ok

## 2022-09-12 ENCOUNTER — TELEPHONE (OUTPATIENT)
Dept: SURGERY | Facility: CLINIC | Age: 42
End: 2022-09-12

## 2022-09-12 NOTE — TELEPHONE ENCOUNTER
Name of caller: Patient    Reason for Call:  Symptoms  Incision is very sensitive and painful. It feels like there is a large lump/ridge there.it is also warm to the touch. Wondering if normal?     Surgeon:  Dr. Rogers     Recent Surgery:  Yes.    If yes, when & what type:  9/7/2022    Right thyroid lobectomy    Best phone number to reach pt at is: [phone]    Ok to leave a message with medical info? Yes.

## 2022-09-12 NOTE — TELEPHONE ENCOUNTER
"Procedure: Right thyroid lobectomy with recurrent laryngeal nerve monitor. Auto-transplantation of right superior parathyroid gland    Date: 09/07/2022    Surgeon: Ken    Patient concerned with pain and appearance at incision    She reports a large lump/ridge at incision, Warm to touch. Incision is very sensitive and painful.  Some redness associated with this    She reports that the area is hard, not squishy    Left side worse than right side    She has been icing at least 5 times daily    She does not feel she has had a fever, but she has also not checked her temperature. She reports occasionally \"sweats\" and has been getting lightheaded    Drinking at least 64 ounces of water daily and has not taken any narcotics since Saturday    Informed her that a lump and/or ridge is to be expected up to a certain point. Should not however, be causing a significant increase in pain symptoms    Recommend that she continue to utilize ice.     Send photo of incision area via [employer] for Dr. Rogers to review    Will call patient back after Dr. rogers reviews photos and has recommendations    She verbalizes understanding and agrees to the plan    Yue Campo RN-BSN    "

## 2022-09-12 NOTE — TELEPHONE ENCOUNTER
Called patient to inform her that Dr. Rogers would like to see her this week. She reports she goes back to work on Thursday    Will have her be seen tomorrow morning at 1145    Yue Campo RN-BSN

## 2022-09-13 ENCOUNTER — OFFICE VISIT (OUTPATIENT)
Dept: SURGERY | Facility: CLINIC | Age: 42
End: 2022-09-13
Payer: COMMERCIAL

## 2022-09-13 DIAGNOSIS — E22.1: ICD-10-CM

## 2022-09-13 DIAGNOSIS — Z09 SURGERY FOLLOW-UP EXAMINATION: Primary | ICD-10-CM

## 2022-09-13 PROCEDURE — 99024 POSTOP FOLLOW-UP VISIT: CPT | Performed by: SURGERY

## 2022-09-13 NOTE — LETTER
September 16, 2022          Nika Hale MD  ENDOCRINOLOGY [facility]  [address]  [place] [address]      RE:   Sergio Driver 1980      Dear Colleague,    Thank you for referring your patient, Sergio Driver, to [facility]. Please see a copy of my visit note below.    Sergio Driver presents today for surgical followup.  she is doing well following right thyroid lobectomy.  Incisions look fine with no signs of wound infection.  Final pathology revealed benign disease.  She complains of more pain than she was expecting as well as stiffness in her neck.  I feel these symptoms are understandable less than a week after surgery and should continue to improve.  I expect her to make a complete recovery.     Again, thank you for allowing me to participate in the care of your patient.      Sincerely,      Juan Rogers MD

## 2022-09-15 ENCOUNTER — TELEPHONE (OUTPATIENT)
Dept: PHARMACY | Facility: PHYSICIAN GROUP | Age: 42
End: 2022-09-15

## 2022-09-15 DIAGNOSIS — U07.1 INFECTION DUE TO 2019 NOVEL CORONAVIRUS: Primary | ICD-10-CM

## 2022-09-15 NOTE — TELEPHONE ENCOUNTER
Patient of [facility] tested positive for COVID19 on 9/13. Had virtual visit with Tiara Anderson PA-C on 9/15 and recommended treatment with monoclonal antibodies based on patient factors.      Order placed under verbal authorization from Tiara Anderson PA-C.  [facility] will be reaching out to the patient to set up treatment within the 7 day treatment window.     Gala Castro, Pharm.D, [facility]  Medication Therapy Management Pharmacist  [phone]

## 2022-09-16 PROBLEM — E22.1: Status: ACTIVE | Noted: 2022-09-16

## 2022-09-16 NOTE — PROGRESS NOTES
Surgery Postop Note    Sergio Driver presents today for surgical followup.  she is doing well following right thyroid lobectomy.  Incisions look fine with no signs of wound infection.  Final pathology revealed benign disease.  She complains of more pain than she was expecting as well as stiffness in her neck.  I feel these symptoms are understandable less than a week after surgery and should continue to improve.  I expect her to make a complete recovery.  Thank you for the opportunity to help in her care.    Aj Rogers M.D.  [facility]  [phone]    Please route or send letter to:  Primary Care Provider (PCP) and Referring Provider

## 2022-09-19 ENCOUNTER — HOME INFUSION (PRE-WILLOW HOME INFUSION) (OUTPATIENT)
Dept: PHARMACY | Facility: CLINIC | Age: 42
End: 2022-09-19

## 2022-09-22 ENCOUNTER — OFFICE VISIT (OUTPATIENT)
Dept: SURGERY | Facility: CLINIC | Age: 42
End: 2022-09-22
Payer: COMMERCIAL

## 2022-09-22 DIAGNOSIS — Z09 FOLLOW-UP EXAMINATION FOLLOWING SURGERY: Primary | ICD-10-CM

## 2022-09-22 PROCEDURE — 99024 POSTOP FOLLOW-UP VISIT: CPT | Performed by: SURGERY

## 2022-09-22 NOTE — PROGRESS NOTES
Surgery Postop Note    Sergio Driver presents today for surgical followup.  she is doing well following thyroid lobectomy.  Incisions look fine with no signs of wound infection.  There is some surrounding redness which I think is likely due to manipulation and strenuous coughing from her recent COVID diagnosis.  I see no evidence for wound infection or cellulitis.  I expect her to make a complete recovery.  Thank you for the opportunity to help in her care.    Aj Rogers M.D.  [facility]  [phone]    Please route or send letter to:  Primary Care Provider (PCP) and Referring Provider

## 2022-09-22 NOTE — LETTER
September 23, 2022      Zenon Driver MD          RE:   Sergio Driver 1980      Dear Colleague,    Thank you for referring your patient, Sergio Driver, to [facility] at [facility]. Please see a copy of my visit note below.    Sergio Driver presents today for surgical followup.  she is doing well following thyroid lobectomy.  Incisions look fine with no signs of wound infection.  There is some surrounding redness which I think is likely due to manipulation and strenuous coughing from her recent COVID diagnosis.  I see no evidence for wound infection or cellulitis.  I expect her to make a complete recovery.     Again, thank you for allowing me to participate in the care of your patient.      Sincerely,      Juan Rogers MD

## 2022-09-23 NOTE — PROGRESS NOTES
This is a recent snapshot of the patient's [facility] medical record.  For current drug dose and complete information and questions, call [phone] or In Basket pool, [facility] ([id number])  CSN Number:  [id number]

## 2022-11-21 ENCOUNTER — HEALTH MAINTENANCE LETTER (OUTPATIENT)
Age: 42
End: 2022-11-21

## 2022-12-07 ENCOUNTER — TRANSFERRED RECORDS (OUTPATIENT)
Dept: HEALTH INFORMATION MANAGEMENT | Facility: CLINIC | Age: 42
End: 2022-12-07

## 2022-12-13 ENCOUNTER — ANESTHESIA EVENT (OUTPATIENT)
Dept: SURGERY | Facility: CLINIC | Age: 42
End: 2022-12-13
Payer: COMMERCIAL

## 2022-12-13 ENCOUNTER — HOSPITAL ENCOUNTER (OUTPATIENT)
Facility: CLINIC | Age: 42
Discharge: HOME | End: 2022-12-13
Attending: COLON & RECTAL SURGERY | Admitting: COLON & RECTAL SURGERY
Payer: COMMERCIAL

## 2022-12-13 ENCOUNTER — ANESTHESIA (OUTPATIENT)
Dept: SURGERY | Facility: CLINIC | Age: 42
End: 2022-12-13
Payer: COMMERCIAL

## 2022-12-13 VITALS
WEIGHT: 184 LBS | RESPIRATION RATE: 18 BRPM | HEIGHT: 66 IN | DIASTOLIC BLOOD PRESSURE: 80 MMHG | BODY MASS INDEX: 29.57 KG/M2 | SYSTOLIC BLOOD PRESSURE: 106 MMHG | TEMPERATURE: 97.2 F | OXYGEN SATURATION: 99 % | HEART RATE: 75 BPM

## 2022-12-13 LAB — COLONOSCOPY: NORMAL

## 2022-12-13 PROCEDURE — 250N000009 HC RX 250: Performed by: NURSE ANESTHETIST, CERTIFIED REGISTERED

## 2022-12-13 PROCEDURE — 710N000012 HC RECOVERY PHASE 2, PER MINUTE: Performed by: COLON & RECTAL SURGERY

## 2022-12-13 PROCEDURE — 258N000003 HC RX IP 258 OP 636: Performed by: NURSE ANESTHETIST, CERTIFIED REGISTERED

## 2022-12-13 PROCEDURE — 250N000011 HC RX IP 250 OP 636: Performed by: NURSE ANESTHETIST, CERTIFIED REGISTERED

## 2022-12-13 PROCEDURE — 370N000017 HC ANESTHESIA TECHNICAL FEE, PER MIN: Performed by: COLON & RECTAL SURGERY

## 2022-12-13 PROCEDURE — 360N000075 HC SURGERY LEVEL 2, PER MIN: Performed by: COLON & RECTAL SURGERY

## 2022-12-13 PROCEDURE — 999N000141 HC STATISTIC PRE-PROCEDURE NURSING ASSESSMENT: Performed by: COLON & RECTAL SURGERY

## 2022-12-13 PROCEDURE — 272N000001 HC OR GENERAL SUPPLY STERILE: Performed by: COLON & RECTAL SURGERY

## 2022-12-13 RX ORDER — PROPOFOL 10 MG/ML
INJECTION, EMULSION INTRAVENOUS CONTINUOUS PRN
Status: DISCONTINUED | OUTPATIENT
Start: 2022-12-13 | End: 2022-12-13

## 2022-12-13 RX ORDER — FLUMAZENIL 0.1 MG/ML
0.2 INJECTION, SOLUTION INTRAVENOUS
Status: DISCONTINUED | OUTPATIENT
Start: 2022-12-13 | End: 2022-12-13 | Stop reason: HOSPADM

## 2022-12-13 RX ORDER — NALOXONE HYDROCHLORIDE 0.4 MG/ML
0.4 INJECTION, SOLUTION INTRAMUSCULAR; INTRAVENOUS; SUBCUTANEOUS
Status: DISCONTINUED | OUTPATIENT
Start: 2022-12-13 | End: 2022-12-13 | Stop reason: HOSPADM

## 2022-12-13 RX ORDER — NALOXONE HYDROCHLORIDE 0.4 MG/ML
0.2 INJECTION, SOLUTION INTRAMUSCULAR; INTRAVENOUS; SUBCUTANEOUS
Status: DISCONTINUED | OUTPATIENT
Start: 2022-12-13 | End: 2022-12-13 | Stop reason: HOSPADM

## 2022-12-13 RX ORDER — ONDANSETRON 4 MG/1
4 TABLET, ORALLY DISINTEGRATING ORAL EVERY 6 HOURS PRN
Status: DISCONTINUED | OUTPATIENT
Start: 2022-12-13 | End: 2022-12-13 | Stop reason: HOSPADM

## 2022-12-13 RX ORDER — ONDANSETRON 2 MG/ML
4 INJECTION INTRAMUSCULAR; INTRAVENOUS EVERY 6 HOURS PRN
Status: DISCONTINUED | OUTPATIENT
Start: 2022-12-13 | End: 2022-12-13 | Stop reason: HOSPADM

## 2022-12-13 RX ORDER — PROCHLORPERAZINE MALEATE 10 MG
10 TABLET ORAL EVERY 6 HOURS PRN
Status: DISCONTINUED | OUTPATIENT
Start: 2022-12-13 | End: 2022-12-13 | Stop reason: HOSPADM

## 2022-12-13 RX ORDER — SODIUM CHLORIDE, SODIUM LACTATE, POTASSIUM CHLORIDE, CALCIUM CHLORIDE 600; 310; 30; 20 MG/100ML; MG/100ML; MG/100ML; MG/100ML
INJECTION, SOLUTION INTRAVENOUS CONTINUOUS PRN
Status: DISCONTINUED | OUTPATIENT
Start: 2022-12-13 | End: 2022-12-13

## 2022-12-13 RX ORDER — ONDANSETRON 4 MG/1
4 TABLET, ORALLY DISINTEGRATING ORAL EVERY 6 HOURS PRN
Status: CANCELLED | OUTPATIENT
Start: 2022-12-13

## 2022-12-13 RX ORDER — ONDANSETRON 2 MG/ML
INJECTION INTRAMUSCULAR; INTRAVENOUS PRN
Status: DISCONTINUED | OUTPATIENT
Start: 2022-12-13 | End: 2022-12-13

## 2022-12-13 RX ORDER — FENTANYL CITRATE 50 UG/ML
INJECTION, SOLUTION INTRAMUSCULAR; INTRAVENOUS PRN
Status: DISCONTINUED | OUTPATIENT
Start: 2022-12-13 | End: 2022-12-13

## 2022-12-13 RX ORDER — FLUMAZENIL 0.1 MG/ML
0.2 INJECTION, SOLUTION INTRAVENOUS
Status: CANCELLED | OUTPATIENT
Start: 2022-12-13 | End: 2022-12-13

## 2022-12-13 RX ORDER — PROPOFOL 10 MG/ML
INJECTION, EMULSION INTRAVENOUS PRN
Status: DISCONTINUED | OUTPATIENT
Start: 2022-12-13 | End: 2022-12-13

## 2022-12-13 RX ORDER — ONDANSETRON 2 MG/ML
4 INJECTION INTRAMUSCULAR; INTRAVENOUS
Status: DISCONTINUED | OUTPATIENT
Start: 2022-12-13 | End: 2022-12-13 | Stop reason: HOSPADM

## 2022-12-13 RX ORDER — ONDANSETRON 2 MG/ML
4 INJECTION INTRAMUSCULAR; INTRAVENOUS EVERY 6 HOURS PRN
Status: CANCELLED | OUTPATIENT
Start: 2022-12-13

## 2022-12-13 RX ORDER — LIDOCAINE 40 MG/G
CREAM TOPICAL
Status: DISCONTINUED | OUTPATIENT
Start: 2022-12-13 | End: 2022-12-13 | Stop reason: HOSPADM

## 2022-12-13 RX ORDER — PROCHLORPERAZINE MALEATE 10 MG
10 TABLET ORAL EVERY 6 HOURS PRN
Status: CANCELLED | OUTPATIENT
Start: 2022-12-13

## 2022-12-13 RX ORDER — LIDOCAINE HYDROCHLORIDE 10 MG/ML
INJECTION, SOLUTION INFILTRATION; PERINEURAL PRN
Status: DISCONTINUED | OUTPATIENT
Start: 2022-12-13 | End: 2022-12-13

## 2022-12-13 RX ADMIN — LIDOCAINE HYDROCHLORIDE 20 MG: 10 INJECTION, SOLUTION INFILTRATION; PERINEURAL at 10:17

## 2022-12-13 RX ADMIN — PROPOFOL 30 MG: 10 INJECTION, EMULSION INTRAVENOUS at 10:17

## 2022-12-13 RX ADMIN — ONDANSETRON HYDROCHLORIDE 4 MG: 2 INJECTION, SOLUTION INTRAVENOUS at 10:19

## 2022-12-13 RX ADMIN — PROPOFOL 100 MCG/KG/MIN: 10 INJECTION, EMULSION INTRAVENOUS at 10:15

## 2022-12-13 RX ADMIN — MIDAZOLAM 2 MG: 1 INJECTION INTRAMUSCULAR; INTRAVENOUS at 10:12

## 2022-12-13 RX ADMIN — SODIUM CHLORIDE, POTASSIUM CHLORIDE, SODIUM LACTATE AND CALCIUM CHLORIDE: 600; 310; 30; 20 INJECTION, SOLUTION INTRAVENOUS at 10:10

## 2022-12-13 RX ADMIN — FENTANYL CITRATE 25 MCG: 50 INJECTION, SOLUTION INTRAMUSCULAR; INTRAVENOUS at 10:17

## 2022-12-13 ASSESSMENT — ACTIVITIES OF DAILY LIVING (ADL)
ADLS_ACUITY_SCORE: 33
ADLS_ACUITY_SCORE: 35

## 2022-12-13 NOTE — ANESTHESIA POSTPROCEDURE EVALUATION
Patient: Sergio Driver    Procedure: Procedure(s):  COLONOSCOPY (crsal)       Anesthesia Type:  MAC    Note:     Postop Pain Control: Uneventful            Sign Out: Well controlled pain   PONV: No   Neuro/Psych: Uneventful            Sign Out: Acceptable/Baseline neuro status   Airway/Respiratory: Uneventful            Sign Out: Acceptable/Baseline resp. status   CV/Hemodynamics: Uneventful            Sign Out: Acceptable CV status   Other NRE: NONE   DID A NON-ROUTINE EVENT OCCUR? No           Last vitals:  Vitals Value Taken Time   /80 12/13/22 1140   Temp 97.2  F (36.2  C) 12/13/22 1140   Pulse 75 12/13/22 1140   Resp 18 12/13/22 1140   SpO2 99 % 12/13/22 1140       Electronically Signed By: Nilesh Oscar MD  December 13, 2022  1:29 PM

## 2022-12-13 NOTE — ANESTHESIA PREPROCEDURE EVALUATION
Anesthesia Pre-Procedure Evaluation    Patient: Sergio Driver   MRN: [id number] : 1980        Procedure : Procedure(s):  COLONOSCOPY (crsal)          Past Medical History:   Diagnosis Date     Anxiety      Asherman's syndrome      Benign neoplasm of pituitary gland and craniopharyngeal duct (pouch) (H) 2007     Cyst, thyroid      Depression      Dysphagia, unspecified      Gastroesophageal reflux disease      High cholesterol      Hypercholesterolemia      Hyperlipidemia      Migraine      Motion sickness      Pain, abdominal      Uterine fibroid      Uterine rupture     after d&c     UTI (urinary tract infection)      Vasovagal syncope      Vitamin D deficiency       Past Surgical History:   Procedure Laterality Date     ------------OTHER-------------      lap/uterine surgery to repair rupture     ABDOMEN SURGERY      laparoscopy repair after d and c     D & C       DILATION AND CURETTAGE, OPERATIVE HYSTEROSCOPY WITH MORCELLATOR, COMBINED N/A 2014    Procedure: COMBINED DILATION AND CURETTAGE, OPERATIVE HYSTEROSCOPY WITH MORCELLATOR;  Surgeon: Priti Pastrana MD;  Location: [facility]     GENITOURINARY SURGERY      age 8, rectal surgery     GYN SURGERY      D&C, D&C FOR RETAINED PLACENTA, LAPAROSCOPY FOR PERFORATED UTERUS     OPERATIVE HYSTEROSCOPY WITH MORCELLATOR  2011    Procedure:OPERATIVE HYSTEROSCOPY WITH MORCELLATOR; OPERATIVE HYSTEROSCOPY WITH MORCELLATOR WITH LYSIS OF ADHESIONS (MYOSURE DEVISE AND VERSAPOINTE); Surgeon:PRITI PASTRANA; Location:[facility]     OPERATIVE HYSTEROSCOPY WITH MORCELLATOR  2013    Procedure: OPERATIVE HYSTEROSCOPY WITH MORCELLATOR;  OPERATIVE HYSTEROSCOPY, REMOVAL INTRAUTERINE MASS WITH LEWIS NEPHEW MORCELLATOR ;  Surgeon: Priti Pastrana MD;  Location: [facility]     RECTAL SURGERY       THYROIDECTOMY Right 2022    Procedure: Right thyroid lobectomy;  Surgeon: Juan Rogers MD;  Location:  OR      Allergies   Allergen Reactions      Bromocriptine Mesylate Other (See Comments)     vertigo     Sulfa Drugs Hives      Social History     Tobacco Use     Smoking status: Never     Smokeless tobacco: Never   Substance Use Topics     Alcohol use: Yes     Comment: 1 per week      Wt Readings from Last 1 Encounters:   12/13/22 83.5 kg (184 lb)        Anesthesia Evaluation   Pt has had prior anesthetic. Type: General.    No history of anesthetic complications       ROS/MED HX  ENT/Pulmonary:  - neg pulmonary ROS     Neurologic:  - neg neurologic ROS     Cardiovascular:  - neg cardiovascular ROS     METS/Exercise Tolerance:     Hematologic:  - neg hematologic  ROS     Musculoskeletal:  - neg musculoskeletal ROS     GI/Hepatic:  - neg GI/hepatic ROS   (+) GERD, Asymptomatic on medication,     Renal/Genitourinary:  - neg Renal ROS     Endo:     (+) thyroid problem, hypothyroidism,     Psychiatric/Substance Use:  - neg psychiatric ROS     Infectious Disease:  - neg infectious disease ROS     Malignancy:  - neg malignancy ROS     Other:  - neg other ROS          Physical Exam    Airway        Mallampati: I   TM distance: > 3 FB   Neck ROM: full   Mouth opening: > 3 cm    Respiratory Devices and Support         Dental  no notable dental history         Cardiovascular   cardiovascular exam normal          Pulmonary   pulmonary exam normal                OUTSIDE LABS:  CBC:   Lab Results   Component Value Date    WBC 7.2 03/17/2015    WBC 6.5 05/14/2014    HGB 13.5 03/17/2015    HGB 13.3 05/14/2014    HCT 40.0 03/17/2015    HCT 39.3 05/14/2014     03/17/2015     05/14/2014     BMP:   Lab Results   Component Value Date     03/17/2015     05/14/2014    POTASSIUM 3.9 03/17/2015    POTASSIUM 4.0 05/14/2014    CHLORIDE 105 03/17/2015    CHLORIDE 101 05/14/2014    CO2 26 03/17/2015    CO2 26 05/14/2014    BUN 10 03/17/2015    BUN 11 05/14/2014    CR 0.74 03/17/2015    CR 0.69 05/14/2014    GLC 89 03/17/2015    GLC 96 05/14/2014     COAGS: No  results found for: PTT, INR, FIBR  POC:   Lab Results   Component Value Date    HCG Negative 09/07/2022    HCGS Negative 05/14/2014     HEPATIC:   Lab Results   Component Value Date    ALBUMIN 3.7 03/17/2015    PROTTOTAL 7.7 03/17/2015    ALT 27 03/17/2015    AST 19 03/17/2015    ALKPHOS 38 (L) 03/17/2015    BILITOTAL 0.6 03/17/2015     OTHER:   Lab Results   Component Value Date    VERNA 9.0 03/17/2015    LIPASE 73 05/14/2014       Anesthesia Plan    ASA Status:  2   NPO Status:  NPO Appropriate    Anesthesia Type: MAC.              Consents    Anesthesia Plan(s) and associated risks, benefits, and realistic alternatives discussed. Questions answered and patient/representative(s) expressed understanding.    - Discussed:     - Discussed with:  Patient         Postoperative Care    Pain management: IV analgesics, Oral pain medications, Multi-modal analgesia.   PONV prophylaxis: Ondansetron (or other 5HT-3), Dexamethasone or Solumedrol     Comments:                Nilesh Oscar MD

## 2022-12-13 NOTE — ANESTHESIA CARE TRANSFER NOTE
Patient: Sergio Driver    Procedure: Procedure(s):  COLONOSCOPY (crsal)       Diagnosis: Change in bowel function [R19.8]  Diagnosis Additional Information: No value filed.    Anesthesia Type:   MAC     Note:    Oropharynx: oropharynx clear of all foreign objects and spontaneously breathing  Level of Consciousness: awake  Oxygen Supplementation: room air    Independent Airway: airway patency satisfactory and stable  Dentition: dentition unchanged  Vital Signs Stable: post-procedure vital signs reviewed and stable  Report to RN Given: handoff report given  Patient transferred to: Phase II    Handoff Report: Identifed the Patient, Identified the Reponsible Provider, Reviewed the pertinent medical history, Discussed the surgical course, Reviewed Intra-OP anesthesia mangement and issues during anesthesia, Set expectations for post-procedure period and Allowed opportunity for questions and acknowledgement of understanding      Vitals:  Vitals Value Taken Time   BP     Temp     Pulse     Resp     SpO2 97 % 12/13/22 1050   Vitals shown include unvalidated device data.    Electronically Signed By: MONO Collier CRNA  December 13, 2022  10:52 AM

## 2022-12-13 NOTE — DISCHARGE INSTRUCTIONS
DR. QUINTON ORTA M.D.                                        CLINIC PHONE NUMBER:  [phone]                                [facility]     SED[facility] ADULT DISCHARGE INSTRUCTIONS   SPECIAL PRECAUTIONS FOR 24 HOURS AFTER SURGERY    IT IS NOT UNUSUAL TO FEEL LIGHT-HEADED OR FAINT, UP TO 24 HOURS AFTER SURGERY OR WHILE TAKING PAIN MEDICATION.  IF YOU HAVE THESE SYMPTOMS; SIT FOR A FEW MINUTES BEFORE STANDING AND HAVE SOMEONE ASSIST YOU WHEN YOU GET UP TO WALK OR USE THE BATHROOM.    YOU SHOULD REST AND RELAX FOR THE NEXT 24 HOURS AND YOU MUST MAKE ARRANGEMENTS TO HAVE SOMEONE STAY WITH YOU FOR AT LEAST 24 HOURS AFTER YOUR DISCHARGE.  AVOID HAZARDOUS AND STRENUOUS ACTIVITIES.  DO NOT MAKE IMPORTANT DECISIONS FOR 24 HOURS.    DO NOT DRIVE ANY VEHICLE OR OPERATE MECHANICAL EQUIPMENT FOR 24 HOURS FOLLOWING THE END OF YOUR SURGERY.  EVEN THOUGH YOU MAY FEEL NORMAL, YOUR REACTIONS MAY BE AFFECTED BY THE MEDICATION YOU HAVE RECEIVED.    DO NOT DRINK ALCOHOLIC BEVERAGES FOR 24 HOURS FOLLOWING YOUR SURGERY.    DRINK CLEAR LIQUIDS (APPLE JUICE, GINGER ALE, 7-UP, BROTH, ETC.).  PROGRESS TO YOUR REGULAR DIET AS YOU FEEL ABLE.    YOU MAY HAVE A DRY MOUTH, A SORE THROAT, MUSCLES ACHES OR TROUBLE SLEEPING.  THESE SHOULD GO AWAY AFTER 24 HOURS.    CALL YOUR DOCTOR FOR ANY OF THE FOLLOWING:  SIGNS OF INFECTION (FEVER, GROWING TENDERNESS AT THE SURGERY SITE, A LARGE AMOUNT OF DRAINAGE OR BLEEDING, SEVERE PAIN, FOUL-SMELLING DRAINAGE, REDNESS OR SWELLING.    IT HAS BEEN OVER 8 TO 10 HOURS SINCE SURGERY AND YOU ARE STILL NOT ABLE TO URINATE (PASS WATER).

## 2023-11-26 ENCOUNTER — HEALTH MAINTENANCE LETTER (OUTPATIENT)
Age: 43
End: 2023-11-26

## 2024-01-29 ENCOUNTER — LAB REQUISITION (OUTPATIENT)
Dept: LAB | Facility: CLINIC | Age: 44
End: 2024-01-29
Payer: COMMERCIAL

## 2024-01-29 DIAGNOSIS — L65.0 TELOGEN EFFLUVIUM: ICD-10-CM

## 2024-01-29 LAB
BASOPHILS # BLD AUTO: 0.1 10E3/UL (ref 0–0.2)
BASOPHILS NFR BLD AUTO: 1 %
EOSINOPHIL # BLD AUTO: 0.1 10E3/UL (ref 0–0.7)
EOSINOPHIL NFR BLD AUTO: 1 %
ERYTHROCYTE [DISTWIDTH] IN BLOOD BY AUTOMATED COUNT: 11.7 % (ref 10–15)
HCT VFR BLD AUTO: 40.4 % (ref 35–47)
HGB BLD-MCNC: 13.3 G/DL (ref 11.7–15.7)
IMM GRANULOCYTES # BLD: 0 10E3/UL
IMM GRANULOCYTES NFR BLD: 0 %
LYMPHOCYTES # BLD AUTO: 2 10E3/UL (ref 0.8–5.3)
LYMPHOCYTES NFR BLD AUTO: 35 %
MCH RBC QN AUTO: 30 PG (ref 26.5–33)
MCHC RBC AUTO-ENTMCNC: 32.9 G/DL (ref 31.5–36.5)
MCV RBC AUTO: 91 FL (ref 78–100)
MONOCYTES # BLD AUTO: 0.5 10E3/UL (ref 0–1.3)
MONOCYTES NFR BLD AUTO: 8 %
NEUTROPHILS # BLD AUTO: 3 10E3/UL (ref 1.6–8.3)
NEUTROPHILS NFR BLD AUTO: 55 %
NRBC # BLD AUTO: 0 10E3/UL
NRBC BLD AUTO-RTO: 0 /100
PLATELET # BLD AUTO: 300 10E3/UL (ref 150–450)
RBC # BLD AUTO: 4.44 10E6/UL (ref 3.8–5.2)
TSH SERPL DL<=0.005 MIU/L-ACNC: 1.44 UIU/ML (ref 0.3–4.2)
WBC # BLD AUTO: 5.6 10E3/UL (ref 4–11)

## 2024-01-29 PROCEDURE — 85025 COMPLETE CBC W/AUTO DIFF WBC: CPT | Mod: ORL | Performed by: SPECIALIST

## 2024-01-29 PROCEDURE — 84443 ASSAY THYROID STIM HORMONE: CPT | Mod: ORL | Performed by: SPECIALIST

## 2024-01-29 PROCEDURE — 82728 ASSAY OF FERRITIN: CPT | Mod: ORL | Performed by: SPECIALIST

## 2024-01-30 LAB — FERRITIN SERPL-MCNC: 127 NG/ML (ref 6–175)

## 2024-02-04 ENCOUNTER — HEALTH MAINTENANCE LETTER (OUTPATIENT)
Age: 44
End: 2024-02-04

## 2025-01-04 ENCOUNTER — HEALTH MAINTENANCE LETTER (OUTPATIENT)
Age: 45
End: 2025-01-04

## (undated) DEVICE — SU VICRYL 3-0 TIE 12X18" J904T

## (undated) DEVICE — PREP CHLORAPREP W/ORANGE TINT 10.5ML 930715

## (undated) DEVICE — DRSG STERI STRIP 1/4X3" R1541

## (undated) DEVICE — PAD CHUX UNDERPAD 30X36" P3036C

## (undated) DEVICE — TUBING SUCTION MEDI-VAC SOFT 3/16"X20' N520A

## (undated) DEVICE — SYR EAR BULB 3OZ 0035830

## (undated) DEVICE — SUCTION CANISTER MEDIVAC LINER 3000ML W/LID 65651-530

## (undated) DEVICE — LINEN TOWEL PACK X5 5464

## (undated) DEVICE — TUBE ENDOTRACHEAL NIM EMG 7.0MM 8229307

## (undated) DEVICE — SOL WATER IRRIG 1000ML BOTTLE 2F7114

## (undated) DEVICE — TONGUE DEPRESSOR STERILE 25-705-ALL

## (undated) DEVICE — DRSG GAUZE 4X4" 3033

## (undated) DEVICE — SU VICRYL 2-0 SH 27" J317H

## (undated) DEVICE — BAG CLEAR TRASH 1.3M 39X33" P4040C

## (undated) DEVICE — SPONGE RAY-TEC 4X8" 7318

## (undated) DEVICE — GOWN XLG DISP 9545

## (undated) DEVICE — Device

## (undated) DEVICE — GLOVE PROTEXIS BLUE W/NEU-THERA 7.5  2D73EB75

## (undated) DEVICE — ESU GROUND PAD UNIVERSAL W/O CORD

## (undated) DEVICE — DISSECTOR LAP SONICISION CVD JAW ULTRASONIC SCDA13

## (undated) DEVICE — SUCTION MANIFOLD NEPTUNE 2 SYS 4 PORT 0702-020-000

## (undated) DEVICE — LINEN FULL SHEET 5511

## (undated) DEVICE — SU VICRYL 4-0 TIE 12X18" DYED J103T

## (undated) DEVICE — NIM PROBE PRASS STIMULATOR PROTECTED TIP 8225101

## (undated) DEVICE — PACK MINOR SBA15MIFSE

## (undated) DEVICE — LINEN HALF SHEET 5512

## (undated) DEVICE — DRAIN JACKSON PRATT RESERVOIR 100ML SU130-1305

## (undated) DEVICE — BLADE KNIFE SURG 15 371115

## (undated) DEVICE — SU SILK 3-0 SH 30" K832H

## (undated) DEVICE — GLOVE PROTEXIS W/NEU-THERA 7.5  2D73TE75

## (undated) DEVICE — SPONGE KITTNER 31001010

## (undated) DEVICE — ESU HOLSTER PLASTIC DISP E2400

## (undated) DEVICE — ESU ELEC BLADE NN-STCK PLUMEPEN PRO 360D 10FT PLPRO4020

## (undated) DEVICE — PACK UNIVERSAL SPLIT 29131

## (undated) DEVICE — DRAIN JACKSON PRATT 10FR ROUND SU130-1321

## (undated) DEVICE — SU VICRYL 3-0 SH 27" UND J416H

## (undated) DEVICE — SU PROLENE 5-0 RB-2DA 30" 8710H

## (undated) DEVICE — SOL WATER IRRIG 500ML BOTTLE 2F7113

## (undated) DEVICE — SOL NACL 0.9% IRRIG 1000ML BOTTLE 2F7124

## (undated) DEVICE — SU MONOCRYL 4-0 P-3 18" UND Y494G

## (undated) DEVICE — KIT PROCEDURE W/CLEAN-A-SCOPE LINERS V2 200800

## (undated) RX ORDER — PROPOFOL 10 MG/ML
INJECTION, EMULSION INTRAVENOUS
Status: DISPENSED
Start: 2022-09-07

## (undated) RX ORDER — FENTANYL CITRATE 0.05 MG/ML
INJECTION, SOLUTION INTRAMUSCULAR; INTRAVENOUS
Status: DISPENSED
Start: 2022-09-07

## (undated) RX ORDER — HYDROMORPHONE HCL IN WATER/PF 6 MG/30 ML
PATIENT CONTROLLED ANALGESIA SYRINGE INTRAVENOUS
Status: DISPENSED
Start: 2022-09-07

## (undated) RX ORDER — PROPOFOL 10 MG/ML
INJECTION, EMULSION INTRAVENOUS
Status: DISPENSED
Start: 2022-12-13

## (undated) RX ORDER — LIDOCAINE HYDROCHLORIDE 10 MG/ML
INJECTION, SOLUTION EPIDURAL; INFILTRATION; INTRACAUDAL; PERINEURAL
Status: DISPENSED
Start: 2022-12-13

## (undated) RX ORDER — FENTANYL CITRATE 50 UG/ML
INJECTION, SOLUTION INTRAMUSCULAR; INTRAVENOUS
Status: DISPENSED
Start: 2022-12-13

## (undated) RX ORDER — ONDANSETRON 2 MG/ML
INJECTION INTRAMUSCULAR; INTRAVENOUS
Status: DISPENSED
Start: 2022-12-13

## (undated) RX ORDER — HYDROMORPHONE HYDROCHLORIDE 1 MG/ML
INJECTION, SOLUTION INTRAMUSCULAR; INTRAVENOUS; SUBCUTANEOUS
Status: DISPENSED
Start: 2022-09-07

## (undated) RX ORDER — CEFAZOLIN SODIUM/WATER 2 G/20 ML
SYRINGE (ML) INTRAVENOUS
Status: DISPENSED
Start: 2022-09-07

## (undated) RX ORDER — FENTANYL CITRATE 50 UG/ML
INJECTION, SOLUTION INTRAMUSCULAR; INTRAVENOUS
Status: DISPENSED
Start: 2022-09-07

## (undated) RX ORDER — HYDROCODONE BITARTRATE AND ACETAMINOPHEN 5; 325 MG/1; MG/1
TABLET ORAL
Status: DISPENSED
Start: 2022-09-07

## (undated) RX ORDER — HYDROXYZINE HYDROCHLORIDE 25 MG/1
TABLET, FILM COATED ORAL
Status: DISPENSED
Start: 2022-09-07